# Patient Record
Sex: MALE | Race: WHITE | NOT HISPANIC OR LATINO | URBAN - METROPOLITAN AREA
[De-identification: names, ages, dates, MRNs, and addresses within clinical notes are randomized per-mention and may not be internally consistent; named-entity substitution may affect disease eponyms.]

---

## 2021-03-31 DIAGNOSIS — Z23 ENCOUNTER FOR IMMUNIZATION: ICD-10-CM

## 2022-09-07 ENCOUNTER — TELEMEDICINE (OUTPATIENT)
Dept: BEHAVIORAL/MENTAL HEALTH CLINIC | Facility: CLINIC | Age: 67
End: 2022-09-07
Payer: COMMERCIAL

## 2022-09-07 DIAGNOSIS — F33.1 MAJOR DEPRESSIVE DISORDER, RECURRENT EPISODE, MODERATE (HCC): Primary | ICD-10-CM

## 2022-09-07 PROCEDURE — 90834 PSYTX W PT 45 MINUTES: CPT | Performed by: PSYCHOLOGIST

## 2022-09-07 NOTE — PSYCH
Psychotherapy Provided: Individual Psychotherapy 45 minutes     Length of time in session: 45 minutes, follow up in 1 week  Week  Began at 10: 03 and ended at 11:08 with a couple interruptions due to amwell problems  No diagnosis found  Major depressive disorder moderate recurrent    Goals addressed in session: Goal 1     Pain:      moderate to severe    5    Current suicide risk : Low     D:  Reached client on Amwell Now  Client had some issues connecting initially, unable to get cursor to move to "name" section  Reached him on phone and talked him through process  He tried multiple things and was finally able to connect on Amwell Now  Session lasted 30 minutes then timed out  IT was able to recover video - EAD  times out after 30 minutes  Can now address issue  Client stated that he has been feeling angry, depressed, chaotic last couple months  He was triggered again in July by the "five week" event with his partner - an incident that happened as the relationship was turning more serious and exclusive  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, but 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  Reviewed coping skills, what has worked in the past which is no longer working, and what he is trying now  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, 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  A:  Client's mood was depressed, affect congruent  Reported that he is easily triggered to anger  He has interrupted sleep which exacerbates depressed mood  Also complained of anxiety and pounding heart  Speech was normal in rate, quantity and volume  Client was alert, and oriented x3  Client was friendly, cooperative and mostly relaxed  Thought process was logical, and he complained of emotions at times interfering with his problem solving skills  Judgment and insight were fair    Attention was consistent  P:  Return in one week  Client will continue to use learned coping skills  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

## 2022-09-14 ENCOUNTER — TELEMEDICINE (OUTPATIENT)
Dept: BEHAVIORAL/MENTAL HEALTH CLINIC | Facility: CLINIC | Age: 67
End: 2022-09-14
Payer: COMMERCIAL

## 2022-09-14 DIAGNOSIS — F33.1 MAJOR DEPRESSIVE DISORDER, RECURRENT EPISODE, MODERATE (HCC): Primary | ICD-10-CM

## 2022-09-14 PROCEDURE — 90834 PSYTX W PT 45 MINUTES: CPT | Performed by: PSYCHOLOGIST

## 2022-09-14 NOTE — PSYCH
Psychotherapy Provided: Individual Psychotherapy 45 minutes     Length of time in session: 45 minutes, follow up in 1 week    Encounter Diagnosis     ICD-10-CM    1  Major depressive disorder, recurrent episode, moderate (HCC)  F33 1        Goals addressed in session: Goal 1     Pain:      moderate to severe    5    Current suicide risk : Low     D:  Reached client at home via [address] now  Client had some computer difficulties so we spoke by phone for the first 20 minutes (about), as his computer loaded  Finished last 40 minutes (about) via [employer] now  Client reported that he has been feeling more angry - when driving, with his wife when triggered, with family, etc   However, overall, things with his partner are going very well  He is also helping out his son with  and mowing  Reviewed the Forgiveness handout and he will practice this week  Also reviewed thought stopping and thought swapping - to more positive thoughts  He will try that - try to create new neural pathways that are more positive  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, flying, snowmobile, , - explored how he is used to the surge of adrenalin - which may also come with anger and has become a familiar habit or mental pathway  Talked about benefits of creating more positive focused neural pathways  A:  Client was friendly, cooperative and casually dressed  He was alert and oriented x3  Speech was normal in rate, volume and quantity  Mood was somewhat discouraged, affect appropriate to situation  Thought process was logical and tinged by emotions at times  No issues with SI/HI/ DnA  P:  Return in one week    Client shared that he has been feeling extremely anxious and angry lately, and is realizing that his anger reactions are more intense than normal  He is interested in the Anger Management Group as well and I will make referral  Will practice thought stopping - and then replacing negative thoughts with more positive ones  Will re-evaluate next session  Behavioral Health Treatment Plan [facility]: Diagnosis and Treatment Plan explained to Naun Harris relates understanding diagnosis and is agreeable to Treatment Plan  Yes     Virtual Regular Visit    Verification of patient location:    Patient is located in the following state in which I hold an active license [place]      Assessment/Plan:    Problem List Items Addressed This Visit        Other    Major depressive disorder, recurrent episode, moderate ([address] ) - Primary          Goals addressed in session: Goal 1          Reason for visit is No chief complaint on file  Encounter provider Clayton Hartman PhD    Provider located at [address] [place] [address]  [phone]      Recent Visits  Date Type Provider Dept   09/07/22 Telemedicine PhD Lauro Jimenez recent visits within past 7 days and meeting all other requirements  Today's Visits  Date Type Provider Dept   09/14/22 Telemedicine PhD Lauro Jimenez today's visits and meeting all other requirements  Future Appointments  No visits were found meeting these conditions  Showing future appointments within next 150 days and meeting all other requirements       The patient was identified by name and date of birth  Hiteshmalik Moreira was informed that this is a telemedicine visit and that the visit is being conducted through[facility] and patient was informed that this is a secure, HIPAA-compliant platform  He agrees to proceed     My office door was closed  No one else was in the room  He acknowledged consent and understanding of privacy and security of the video platform   The patient has agreed to participate and understands they can discontinue the visit at any time  Patient is aware this is a billable service  Juju Elizabeth is a 69 y o  male who was friendly and cooperative throughout the session   HPI     No past medical history on file  No past surgical history on file  No current outpatient medications on file  No current facility-administered medications for this visit  Not on File    Review of Systems    Video Exam    There were no vitals filed for this visit      Physical Exam     I spent 45 minutes directly with the patient during this visit

## 2022-09-22 ENCOUNTER — TELEMEDICINE (OUTPATIENT)
Dept: BEHAVIORAL/MENTAL HEALTH CLINIC | Facility: CLINIC | Age: 67
End: 2022-09-22
Payer: COMMERCIAL

## 2022-09-22 DIAGNOSIS — F33.1 MAJOR DEPRESSIVE DISORDER, RECURRENT EPISODE, MODERATE (HCC): Primary | ICD-10-CM

## 2022-09-22 PROCEDURE — 90834 PSYTX W PT 45 MINUTES: CPT | Performed by: PSYCHOLOGIST

## 2022-09-22 NOTE — PSYCH
Virtual Regular Visit    Verification of patient location:    Patient is located in the following state in which I hold an active license [place]   Session began at 10:03  Session ended at 10:55a      Assessment/Plan:    Problem List Items Addressed This Visit        Other    Major depressive disorder, recurrent episode, moderate ([address] ) - Primary          Goals addressed in session: Goal 1          Reason for visit is No chief complaint on file  Encounter provider Mary Ferrara, PhD    Provider located at [address] [place] [id number] [phone]      Recent Visits  Date Type Provider Dept   09/22/22 Telemedicine Mary Ferrara, PhD Romayne Sine recent visits within past 7 days and meeting all other requirements  Future Appointments  No visits were found meeting these conditions  Showing future appointments within next 150 days and meeting all other requirements       The patient was identified by name and date of birth  Isabell Whiting was informed that this is a telemedicine visit and that [facility] and patient was informed that this is a secure, HIPAA-compliant platform  He agrees to proceed     My office door was closed  No one else was in the room  He acknowledged consent and understanding of privacy and security of the video platform  The patient has agreed to participate and understands they can discontinue the visit at any time  Patient is aware this is a billable service  Subjective  Isabell Whiting is a 69 y o  male who was friendly and cooperative  D:  Client was reached on [facility] now  Client had some trouble connecting, and we worked through this on the phone  He was able to connect fairly quickly   Client shared that he had been at the races over the weekend, and had 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 "  With some negotiating, he was able to set things up  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 about driving it more, and was given a positive response and a cost   Talked about his relationship with Amanda, and how she is feeling less secure  Some frustration with selling the business, and also, past conflict that occurred between the two of them when he was racing in the past   Client able to have better understanding of current stressors and what they may be triggering in himself and in [place]  A:  Client was casually dressed, friendly and cooperative  He was alert and orientec x3  Speech was clear and  Mood was good  Affect was congruent  No issues with si, hi, dna  Insight and judgment were intact  Client processed his emotions, was able to see the positives in his current relationship, but also realize that things might be affect ing [place] in a way that was different from how they were affecting him  Expressed appreciation for the session  P:  Return in 1-2 weeks  Will move to biweekly  Would appreciate support and help with managing relationship with Alejandra as they sell business, and perhaps, move to [place] in the future  Continue to use coping skills  Continue to listen to wife and be supportive  HPI     No past medical history on file  No past surgical history on file  No current outpatient medications on file  No current facility-administered medications for this visit  Not on File    Review of Systems    Video Exam    There were no vitals filed for this visit      Physical Exam     I spent 50 minutes directly with the patient during this visit

## 2022-09-29 ENCOUNTER — TELEMEDICINE (OUTPATIENT)
Dept: BEHAVIORAL/MENTAL HEALTH CLINIC | Facility: CLINIC | Age: 67
End: 2022-09-29
Payer: COMMERCIAL

## 2022-09-29 DIAGNOSIS — F33.1 MAJOR DEPRESSIVE DISORDER, RECURRENT EPISODE, MODERATE (HCC): Primary | ICD-10-CM

## 2022-09-29 PROCEDURE — 90834 PSYTX W PT 45 MINUTES: CPT | Performed by: PSYCHOLOGIST

## 2022-09-29 NOTE — PSYCH
Virtual Regular Visit    Verification of patient location:    Patient is located in the following state in which I [place] an [place] license [place]      Assessment/Plan:    Problem List Items Addressed This Visit    None         Goals addressed in session: Goal 1          Reason for visit is No chief complaint on file  Encounter provider Liam Friedman, PhD    Provider located at [address] [place] [address] [address]  [address] [place] [id number] [phone]      Recent Visits  Date Type Provider Dept   09/22/22 Telemedicine Liam Friedman, PhD Mac Torres recent visits within past 7 days and meeting all other requirements  Future Appointments  No visits were found meeting these conditions  Showing future appointments within next 150 days and meeting all other requirements       The patient was identified by name and date of birth  Sarah Mix was informed that this is a telemedicine visit and that [facility] and patient was informed that this is a secure, HIPAA-compliant platform  He agrees to proceed     My office door was closed  No one else was in the room  He acknowledged consent and understanding of privacy and security of the video platform  The patient has agreed to participate and understands they can discontinue the visit at any time  Patient is aware this is a billable service  Subjective  Sarah Mix is a 69 y o  male who was friendly and cooperative    D:  Reached client at home on amwell now  Connected by phone, [employer] link, then client was able to connect on [address] now  Client reported that he told Brenna Martínez that he did not trust her - that he was afraid to leave home and leave her behind  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 and feels that it is his anger at Brenna Martínez that is bothering him   We did a guided medication where he visualized the anger (as a tight knot in his stomach)  He then painted the knot "red" and imagined it spinning (clockwise)  Through the guided meditation he slowed down the spin and  Began to wash out the red color  It them jumped to the wheels of a race car spinning - and they were black  Again used breath work to slow down the spin and lighten the color  Then reported that he could feel his mother's hand on his shoulder and her eyes on him  Reported that he felt peaceful and content - no racing thoughts or angry feelings  Tapped in this peacefulness  A: Client was friendly and cooperative  He was alert and oriented x3  Speech was normal in quantity, rate and volume,  Mood was good and affect congruent  Client reported that sometimes he becomes very angry and has a hard time managing it  Thought process was logical with no issues with SI or HI  Insight and judgment were fair  Client was able to use his breath and relaxation to ease the knot of anger inside of him - at least temporarily  P:  Return in two weeks  Continue to use mindfulness to manage emotions  Try to accept the good that comes his way and focus on that, rather than the bad  [facility]     No past medical history on file  No past surgical history on file  No current outpatient medications on file  No current facility-administered medications for this visit  Not on File    Review of Systems    Video Exam    There were no vitals filed for this visit      Physical Exam     I spent 45 minutes directly with the patient during this visit

## 2022-10-12 ENCOUNTER — TELEMEDICINE (OUTPATIENT)
Dept: BEHAVIORAL/MENTAL HEALTH CLINIC | Facility: CLINIC | Age: 67
End: 2022-10-12
Payer: COMMERCIAL

## 2022-10-12 DIAGNOSIS — F33.1 MAJOR DEPRESSIVE DISORDER, RECURRENT EPISODE, MODERATE (HCC): Primary | ICD-10-CM

## 2022-10-12 PROCEDURE — 90834 PSYTX W PT 45 MINUTES: CPT | Performed by: PSYCHOLOGIST

## 2022-10-12 NOTE — PSYCH
Virtual Regular Visit    Verification of patient location:    Patient is located in the following state in which I hold an [place] license [place]     Start time 9:05 a  Justice Rust time 9: 55a      Assessment/Plan:    Problem List Items Addressed This Visit    None         Goals addressed in session: Goal 1          Reason for visit is No chief complaint on file  Encounter provider Marie Roland, PhD    Provider located at [address] [place] [address] [address]  [address] [place] [address]  [phone]      Recent Visits  No visits were found meeting these conditions  Showing recent visits within past 7 days and meeting all other requirements  Future Appointments  No visits were found meeting these conditions  Showing future appointments within next 150 days and meeting all other requirements       The patient was identified by name and date of birth  Cali Ordaz was informed that this is a telemedicine visit and that the visit is being conducted through[employer] and patient was informed that this is a secure, HIPAA-compliant platform  He agrees to proceed     My office door was closed  No one else was in the room  He acknowledged consent and understanding of privacy and security of the video platform  The patient has agreed to participate and understands they can discontinue the visit at any time  Patient is aware this is a billable service  Subjective  Cali Ordaz is a 69 y o  male Client was friendly and cooperative  Start time 9:05 a  Justice Rust time 5: 55a    D:  Reached client on amwell now  Client reported that he is feeling more and more easily angered  He stated that he can get very angry driving in traffic and needs to use his coping skills to help calm himself down  He reported that he and his wife are planning to move to [place] in one year - after Heather James sells her business    His son was surprised to hear this, but Al pointed out that his son barely does anything together with him as a family, so he will not be losing much  A: Client was alert and oriented x3  Friendly and cooperative  Spoke easily about increased feelings of anger  He is interested in the Anger Management group and is on the wait list for the next group to start  Talked about ways to manage anger and explored some of the deeper traumas which may be feeding the anger  Provided empathy and support along with helping client explore his hurt and anger issues throughout the years  P:  Return in 2-3 weeks  Client will continue to use mindfulness to help manage emotions  He will continue to practice strong box and safe place  He will focus on the positive things in his life, and shift his attention to toward the positive, after thought stopping the negative chain of thoughts  Erwin Flowers HPI     No past medical history on file  No past surgical history on file  No current outpatient medications on file  No current facility-administered medications for this visit  Not on File    Review of Systems    Video Exam    There were no vitals filed for this visit      Physical Exam     I spent 50 minutes with patient today in which greater than 50% of the time was spent in counseling/coordination of care regarding exploring anger issues

## 2022-10-27 ENCOUNTER — TELEMEDICINE (OUTPATIENT)
Dept: BEHAVIORAL/MENTAL HEALTH CLINIC | Facility: CLINIC | Age: 67
End: 2022-10-27
Payer: COMMERCIAL

## 2022-10-27 DIAGNOSIS — F33.1 MAJOR DEPRESSIVE DISORDER, RECURRENT EPISODE, MODERATE (HCC): Primary | ICD-10-CM

## 2022-10-27 PROCEDURE — 90834 PSYTX W PT 45 MINUTES: CPT | Performed by: PSYCHOLOGIST

## 2022-10-27 NOTE — PSYCH
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, but has not heard back  Talked about his fear of failure, and his long time sense of being rejected by others  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, and won two races - but still has the long lasting belief that he is a failure  Stated that he had ADHD as a child, which caused him to act impulsively, and to be punished for these behaviors  A:  Client reported that he continues to have a short fuse and be easily irritated  He is still interested in the anger management group  Client wsa alert and oriented x3; he was friendly and cooperative  Speech was normal in quantity, rate and volume  Mood was reported as  irritable, affect variable -     P:  He will take a look at the [employer] and [employer] protocol online  Will discuss further whether something like this might be helpful  Will find ways to support his grandson  Will continue with positive self talk and other learned coping skills  Return in two weeks  Assessment/Plan:    Problem List Items Addressed This Visit        Other    Major depressive disorder, recurrent episode, moderate ([address] ) - Primary          Goals addressed in session: Goal 1          Reason for visit is   Chief Complaint   Patient presents with   • Depression   • Relationship Issues     Address issues with wife and children          Encounter provider [facility], PhD    Provider located at [address] [place] [address] [address]  [address]  [phone]      Recent Visits  No visits were found meeting these conditions  Showing recent visits within past 7 days and meeting all other requirements  Today's Visits  Date Type Provider Dept   10/27/22 Telemedicine Jaun Tucker, PhD Nicole Acevedo today's visits and meeting all other requirements  Future Appointments  No visits were found meeting these conditions  Showing future appointments within next 150 days and meeting all other requirements       The patient was identified by name and date of birth  Juan Jose Hernandez was informed that this is a telemedicine visit and that the visit is being conducted throughTelephone  My office door was closed  No one else was in the room  He acknowledged consent and understanding of privacy and security of the video platform  The patient has agreed to participate and understands they can discontinue the visit at any time  Patient is aware this is a billable service  Subjective  Juan Jose Hernandez is a 69 y o  male who was friendly and cooperative  [facility]     No past medical history on file  No past surgical history on file  No current outpatient medications on file  No current facility-administered medications for this visit  Not on File    Review of Systems    Video Exam    There were no vitals filed for this visit      Physical Exam     Visit Time    Visit Start Time: 9:03a  Visit Stop Time:  895M  Total Visit Duration: 52 minutes

## 2022-11-10 ENCOUNTER — TELEMEDICINE (OUTPATIENT)
Dept: BEHAVIORAL/MENTAL HEALTH CLINIC | Facility: CLINIC | Age: 67
End: 2022-11-10

## 2022-11-10 DIAGNOSIS — F33.1 MAJOR DEPRESSIVE DISORDER, RECURRENT EPISODE, MODERATE (HCC): Primary | ICD-10-CM

## 2022-11-14 NOTE — PSYCH
Virtual Regular Visit    Verification of patient location:    Patient is located in the following state in which I hold an [place] license [place]      Assessment/Plan:    Problem List Items Addressed This Visit        Other    Major depressive disorder, recurrent episode, moderate ([facility] [address] [facility] ) - Primary          Goals addressed in session: Goal 1          Reason for visit is No chief complaint on file  Encounter provider Valdemar Schlatter, PhD    Provider located at [address] [place] [id number] [phone]      Recent Visits  Date Type Provider Dept   11/10/22 Telemedicine Valdemar Schlatter, PhD Marylou Peres recent visits within past 7 days and meeting all other requirements  Future Appointments  No visits were found meeting these conditions  Showing future appointments within next 150 days and meeting all other requirements       The patient was identified by name and date of birth  Sylvia Perkins was informed that this is a telemedicine visit and that the visit is being conducted throughthe [employer] platform  He agrees to proceed     My office door was closed  No one else was in the room  He acknowledged consent and understanding of privacy and security of the video platform  The patient has agreed to participate and understands they can discontinue the visit at any time  Patient is aware this is a billable service  Subjective  Sylvia Perkins is a 69 y o  male     D:  Reached client on [address] now  Client reported that he is very concerned about his grandson  Apparently his grandson has ADHD, and can be quite impulsive  This has caused him some trouble at  and school  Al sees himself in his grandson, and is trying to provide support and positive affirmations  Discussed how to be supportive to his son, his grandson, and how to build good self esteem and academic skills  Al worked through multiple emotions, some related to his own childhood experiences  He came up with some ideas of how to be helpful and supportive and felt good at the end of the session  Provided empathy and support as client explored his own reactions to his grandson and considered the best way to be supportive  A:  Client was casually dressed and reached at home  He was friendly and cooperative; alert and oriented x3  Speech was normal in rate, volume and quantity  Mood was sad/angry; affect appropriate to situation  Thought process was logical and goal directed  No issues with SI or HI  Insight and judgment were good  Client expressed emapthy for his grandson, and explored triggers from his past      P:  Return in 2 weeks  Continue to use learned coping skills  Reach out to family and friends for support  Research Safe and Sound protocol  Will discuss more next session  Continue to be supportive to both son and grandson  Reach out to [facility] to arrange anger management classes  Zafar Chan HPI     No past medical history on file  No past surgical history on file  No current outpatient medications on file  No current facility-administered medications for this visit  Not on File    Review of Systems    Video Exam    There were no vitals filed for this visit      Physical Exam     Visit Time    11/14/22  Start Time: 2469  Stop Time: 9918  Total Visit Time: 60 minutes

## 2022-11-30 ENCOUNTER — TELEMEDICINE (OUTPATIENT)
Dept: BEHAVIORAL/MENTAL HEALTH CLINIC | Facility: CLINIC | Age: 67
End: 2022-11-30

## 2022-11-30 DIAGNOSIS — F33.1 MAJOR DEPRESSIVE DISORDER, RECURRENT EPISODE, MODERATE (HCC): Primary | ICD-10-CM

## 2022-11-30 NOTE — PSYCH
Virtual Regular Visit    Verification of patient location:    Patient is located in the following state in which I hold an active license [place]      Assessment/Plan:    Problem List Items Addressed This Visit        Other    Major depressive disorder, recurrent episode, moderate ([facility] [address] ) - Primary       Goals addressed in session: Goal 1          Reason for visit is No chief complaint on file  Encounter provider Ana Espinoza, PhD    Provider located at [address] [place] [address] [address] [place] [address]  [phone]      Recent Visits  No visits were found meeting these conditions  Showing recent visits within past 7 days and meeting all other requirements  Today's Visits  Date Type Provider Dept   11/30/22 Telemedicine Ana Espinoza, PhD Rowena Hogue today's visits and meeting all other requirements  Future Appointments  No visits were found meeting these conditions  Showing future appointments within next 150 days and meeting all other requirements       The patient was identified by name and date of birth  Connor Marshall was informed that this is a telemedicine visit and that the visit is being conducted throughthe [employer] platform  He agrees to proceed     My office door was closed  No one else was in the room  He acknowledged consent and understanding of privacy and security of the video platform  The patient has agreed to participate and understands they can discontinue the visit at any time  Patient is aware this is a billable service  Subjective  Connor Marshall is a 69 y o  male     D:  Reached client on [employer]  He shared that he is worried about his grandson and son's relationship- his grandson Ross Kiser has ADHD   "I was feeling vulnerable and scared about Ross Kiser, and hurting for him "  You give yourself away (song on radio)  Work is slow   Watching a show [place]   Provided some input on how to be supportive to hernando  Explored issues with trust which is a theme throughout his life, and how this continues to affect his relationship with his wife  A:  Talked about trust as a thread from childhood and into his marriages and even current relationship  Client was friendly and cooperative  He was alert and oriented x3  Mood was mostly good, affect sad when he spoke about his grandson  Client shared that he has had lots of emotions whirling inside of him lately, anger, distress, sadness, frustration  Talked about his ADHD and how his brain would "short circuit" as a child and he would find himself in trouble or ignored and unable to handle that  Talked about being ignored as a child, having anxiety about school, throwing up weekly or more often before school      P:  Return in two weeks  Then client will be on vacation for a couple of weeks, rescheduled in mid January  Talk with grandpaul about "10 hugs a day" and make a game of it  Try Shake It Out exercise with grandson  Help him give positive messages to his dad  HPI     No past medical history on file  No past surgical history on file  No current outpatient medications on file  No current facility-administered medications for this visit  Not on File    Review of Systems    Video Exam    There were no vitals filed for this visit      Physical Exam     Visit Time    11/30/22  Start Time: 0754  Stop Time: 3363  Total Visit Time: 62 minutes

## 2022-12-15 ENCOUNTER — TELEMEDICINE (OUTPATIENT)
Dept: BEHAVIORAL/MENTAL HEALTH CLINIC | Facility: CLINIC | Age: 67
End: 2022-12-15

## 2022-12-15 DIAGNOSIS — F33.1 MAJOR DEPRESSIVE DISORDER, RECURRENT EPISODE, MODERATE (HCC): Primary | ICD-10-CM

## 2022-12-15 NOTE — PSYCH
Virtual Regular Visit    Verification of patient location:    Patient is located in the following state in which I hold an active license [place]      Assessment/Plan:    Problem List Items Addressed This Visit        Other    Major depressive disorder, recurrent episode, moderate ([facility] [address] ) - Primary       Goals addressed in session: Goal 1          Reason for visit is No chief complaint on file  Encounter provider Rosa Bhatti, PhD    Provider located at [address] [place] [address] [address]  [address] [place] [id number] [phone]      Recent Visits  Date Type Provider Dept   12/15/22 Telemedicine Rosa Bhatti, PhD Kwaku Rich recent visits within past 7 days and meeting all other requirements  Future Appointments  No visits were found meeting these conditions  Showing future appointments within next 150 days and meeting all other requirements       The patient was identified by name and date of birth  Frandy Riley was informed that this is a telemedicine visit and that the visit is being conducted throughthe [employer] platform  He agrees to proceed     My office door was closed  No one else was in the room  He acknowledged consent and understanding of privacy and security of the video platform  The patient has agreed to participate and understands they can discontinue the visit at any time  Patient is aware this is a billable service  Subjective  Frandy Riley is a 69 y o  male     D:  Reached client on [employer]  Client stated that he is finding himself triggered by songs which talk about unfaithful partners  Used some EMDR and some IFS to process these feelings  He was able to identify a part of  Himself - a part that was holding on the the intense feelings  One part stated, I want you to feel what it is like to be betrayed    Another part, is holding on to the feelings of pain, so he will never forget, and it won't happen again  A:  Client appeared sad and frustrated  He reported that he is having trouble managing his emotions, which seem to be easily triggered  Speech was normal in quantity reate and volume  Thought process was logical and goal directed  Client was experiencing lots of emotions  No issues with Si or HI      P:  Return after his vacation at [facility]  Use learned coping skills  Continue honoring his protector parts  Continue processing at next session  Sonna Shelter HPI     No past medical history on file  No past surgical history on file  No current outpatient medications on file  No current facility-administered medications for this visit  Not on File    Review of Systems    Video Exam    There were no vitals filed for this visit      Physical Exam     Visit Time    12/20/22  Start Time: 1004  Stop Time: 8179  Total Visit Time: 55 minutes

## 2023-01-18 ENCOUNTER — TELEMEDICINE (OUTPATIENT)
Dept: BEHAVIORAL/MENTAL HEALTH CLINIC | Facility: CLINIC | Age: 68
End: 2023-01-18

## 2023-01-18 DIAGNOSIS — F33.1 MAJOR DEPRESSIVE DISORDER, RECURRENT EPISODE, MODERATE (HCC): Primary | ICD-10-CM

## 2023-01-18 NOTE — BH TREATMENT PLAN
Génesis Jordan  1955       Date of Initial Treatment Plan: 1/18/2023  Date of Current Treatment Plan: 01/18/23    Treatment Plan Number 1     Strengths/Personal Resources for Self Care: Hard worker, good father, thoughtful, kind, generous, loves adventure, financially savvy, enjoys race cars  Diagnosis:   1  Major depressive disorder, recurrent episode, moderate ([facility] [address] )            Area of Needs: Working on improving family relationships and addressing trauma from childhood which continues to negatively impact current life events  Long Term Goal 1: A  Continue to process trauma from past which negatively impacts present relationships    Target Date: N/A  Completion Date: Review in 6 months         Short Term Objectives for Goal 1: A  Trauma work to address childhood issues, explore ADD symptoms & current impact    GOAL 1: Modality: Individual 2x per month   Completion Date review in 6 months      [address]: Diagnosis and Treatment Plan explained to Anna Mayen relates understanding diagnosis and is agreeable to Treatment Plan  Client Comments : Please share your thoughts, feelings, need and/or experiences regarding your treatment plan:  Treatment plan looks good
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
## 2023-02-01 ENCOUNTER — TELEMEDICINE (OUTPATIENT)
Dept: BEHAVIORAL/MENTAL HEALTH CLINIC | Facility: CLINIC | Age: 68
End: 2023-02-01

## 2023-02-01 DIAGNOSIS — F90.0 ATTENTION DEFICIT HYPERACTIVITY DISORDER, INATTENTIVE TYPE: ICD-10-CM

## 2023-02-01 DIAGNOSIS — F33.1 MAJOR DEPRESSIVE DISORDER, RECURRENT EPISODE, MODERATE (HCC): Primary | ICD-10-CM
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
## 2023-02-16 ENCOUNTER — TELEMEDICINE (OUTPATIENT)
Dept: BEHAVIORAL/MENTAL HEALTH CLINIC | Facility: CLINIC | Age: 68
End: 2023-02-16

## 2023-02-16 DIAGNOSIS — F90.0 ATTENTION DEFICIT HYPERACTIVITY DISORDER, INATTENTIVE TYPE: Primary | ICD-10-CM

## 2023-02-16 NOTE — PSYCH
Behavioral Health Psychotherapy Progress Note    Psychotherapy Provided: Individual Psychotherapy     1  Attention deficit hyperactivity disorder, inattentive type            Goals addressed in session: Goal 1     DATA: Reached clindet on amwell now  Client stated that he had had a good talk with Radha Garcia - that she had honestly admitted some of what was going through her mind during the "five" weeks and had honestly shared with him and acknowledged how that had hurt him  Client reported that it had been a good and sincere discussion  However, he then stated that he still felt angry  He felt that we had made some progress, but he was still angry and easily triggered and felt that little progress had been made in this regard  Sequenced through his moods and he reported that despite Amanda giving a more heartfelt apology, he was still angry  We talked about the roots of this anger, and he was able to see that it reached to childhood  During this session, this clinician used the following therapeutic modalities: trauma therapy    Substance Abuse was addressed during this session  If the client is diagnosed with a co-occurring substance use disorder, please indicate any changes in the frequency or amount of use: drinking less  Stage of change for addressing substance use diagnoses: Maintenance    ASSESSMENT:  Eliezer Burroughs presents with a Euthymic/ normal and Angry mood  He stated that he was easily upset when driving and that he does not trust Amanda and is refusing to go on fun activities because he does not want to leave her home alone  his affect is Normal range and intensity and Bright, which is congruent, with his mood and the content of the session  The client has made progress on their goals  Eliezer Burroughs presents with a minimal risk of suicide, minimal risk of self-harm, and minimal risk of harm to others      For any risk assessment that surpasses a "low" rating, a safety plan must be developed  A safety plan was indicated: no  If yes, describe in detail reach out to family and friends for support    PLAN: Between sessions,  See will continue to use mindfulness to  Manage moods, continue to focus on the positive, and make note of any times when he feels sad or angry  At the next session, the therapist will use EMDR (or other form of bilateral Stimulation) to address trauma from childhood  Behavioral Health Treatment Plan and Discharge Planning: Wing Valerio is aware of and agrees to continue to work on their treatment plan  They have identified and are working toward their discharge goals  yes    Visit start and stop times:    02/21/23  Start Time: 1003  Stop Time: 1055  Total Visit Time: 52 minutes         Virtual Regular Visit    Verification of patient location:    Patient is located in the following state in which I hold an active license [place]      Assessment/Plan:    Problem List Items Addressed This Visit    None  Visit Diagnoses     Attention deficit hyperactivity disorder, inattentive type    -  Primary          Goals addressed in session: Goal 1          Reason for visit is   Chief Complaint   Patient presents with   • Virtual Regular Visit        Encounter provider Marah Ventura, PhD    Provider located at [address] [place] [address]  [phone]      Recent Visits  Date Type Provider Dept   02/16/23 Telemedicine Marah Ventura, PhD Franky Hood recent visits within past 7 days and meeting all other requirements  Future Appointments  No visits were found meeting these conditions  Showing future appointments within next 150 days and meeting all other requirements       The patient was identified by name and date of birth   Wing Valerio was informed that this is a telemedicine visit and that the [facility]  He agrees to proceed     My office door was closed  No one else was in the room  He acknowledged consent and understanding of privacy and security of the video platform  The patient has agreed to participate and understands they can discontinue the visit at any time  Patient is aware this is a billable service  Subjective  Mansi Andujar is a 69 y o  male who was friendly and cooperative   [facility]     No past medical history on file  No past surgical history on file  No current outpatient medications on file  No current facility-administered medications for this visit  Not on File    Review of Systems    Video Exam    There were no vitals filed for this visit      Physical Exam     Visit Time  02/21/23  Start Time: 1003  Stop Time: 0932  Total Visit Time: 52 minutes

## 2023-03-01 ENCOUNTER — TELEMEDICINE (OUTPATIENT)
Dept: BEHAVIORAL/MENTAL HEALTH CLINIC | Facility: CLINIC | Age: 68
End: 2023-03-01

## 2023-03-01 DIAGNOSIS — F33.1 MAJOR DEPRESSIVE DISORDER, RECURRENT EPISODE, MODERATE (HCC): Primary | ICD-10-CM

## 2023-03-01 DIAGNOSIS — F90.0 ATTENTION DEFICIT HYPERACTIVITY DISORDER, INATTENTIVE TYPE: ICD-10-CM
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
## 2023-03-15 ENCOUNTER — TELEMEDICINE (OUTPATIENT)
Dept: BEHAVIORAL/MENTAL HEALTH CLINIC | Facility: CLINIC | Age: 68
End: 2023-03-15

## 2023-03-15 DIAGNOSIS — F90.0 ATTENTION DEFICIT HYPERACTIVITY DISORDER, INATTENTIVE TYPE: ICD-10-CM

## 2023-03-15 DIAGNOSIS — F33.1 MAJOR DEPRESSIVE DISORDER, RECURRENT EPISODE, MODERATE (HCC): Primary | ICD-10-CM

## 2023-03-15 NOTE — PSYCH
Behavioral Health Psychotherapy Progress Note      Psychotherapy Provided: Individual Psychotherapy     1  Major depressive disorder, recurrent episode, moderate ([address] )        2  Attention deficit hyperactivity disorder, inattentive type            Goals addressed in session: Goal 1     DATA:     Reached client on amwell now  Client shared that he still struggles with staying in the present moment  He often remembers negative things from the past, Which fill him with self loathing and anger  He then lashes out or has to struggle to keep himself from lashing out  Explored where his anger was coming from  Client was able to remember many incidents in the past when he was either angry at himself or at others  He shared how he was able to keep his secret world separate and felt energized and rewarded for his ability to keep all the balls in the air at once  However, now he does have some regret and a part of him wants him to remember the dangers if he were to ever let his guard down  Client shared that he knows he will never cheat again, but another part of him may be sabotaging his ability to trust Amanda as a way of punishing himself for some of his mistakes  He agreed that his reasoning and emotions are complicated and multifaceted  Will continue to explore this problem in the next sessin  During this session, this clinician used the following therapeutic modalities: Cognitive Behavioral Therapy    Substance Abuse was addressed during this session  If the client is diagnosed with a co-occurring substance use disorder, please indicate any changes in the frequency or amount of use: no change  Stage of change for addressing substance use diagnoses: Maintenance    ASSESSMENT:  Faiza Willis presents with a Euthymic/ normal and Angry mood  his affect is Normal range and intensity, which is congruent, with his mood and the content of the session  The client has made progress on their goals        Faiza Willis presents with a minimal risk of suicide, minimal risk of self-harm, and minimal risk of harm to others  For any risk assessment that surpasses a "low" rating, a safety plan must be developed  A safety plan was indicated: no  If yes, describe in detail reach out to family and friends for support as needed    PLAN: Between sessions, Feli Ashby will continue to focus on the positive, will begin to keep a small journal of insights learned in therapy, will consider forgiving himself for some of the mistakes he has made in the past    At the next session, the therapist will use Cognitive Behavioral Therapy to address issues of anger and depression  Behavioral Health Treatment Plan and Discharge Planning: Feli Ashby is aware of and agrees to continue to work on their treatment plan  They have identified and are working toward their discharge goals   yes    Visit start and stop times:    03/20/23  Start Time: 0800  Stop Time: 0855  Total Visit Time: 55 minutes    Virtual Regular Visit    Verification of patient location:    Patient is located in the following state in which I hold an active license [place]      Assessment/Plan:    Problem List Items Addressed This Visit        Other    Major depressive disorder, recurrent episode, moderate ([facility] [address] ) - Primary   Other Visit Diagnoses     Attention deficit hyperactivity disorder, inattentive type              Goals addressed in session: Goal 1          Reason for visit is   Chief Complaint   Patient presents with   • Virtual Regular Visit        Encounter provider Mo Rivera, PhD    Provider located at [address] [place] [id number] [phone]      Recent Visits  Date Type Provider Dept   03/15/23 Telemedicine Mo Rivera, PhD Patrizia Santos recent visits within past 7 days and meeting all other requirements  Future Appointments  No visits were found meeting these conditions  Showing future appointments within next 150 days and meeting all other requirements       The patient was identified by name and date of birth  Raymundo Fitzgerald was informed that this is a telemedicine visit and that the visit is being conducted throughthe [employer] platform  He agrees to proceed     My office door was closed  No one else was in the room  He acknowledged consent and understanding of privacy and security of the video platform  The patient has agreed to participate and understands they can discontinue the visit at any time  Patient is aware this is a billable service  Subjective  Raymundo Fitzgerald is a 69 y o  male who was friendly and cooperative   [facility]     No past medical history on file  No past surgical history on file  No current outpatient medications on file  No current facility-administered medications for this visit  Not on File    Review of Systems    Video Exam    There were no vitals filed for this visit      Physical Exam     Visit Time  03/20/23  Start Time: 0800  Stop Time: 1629  Total Visit Time: 55 minutes

## 2023-03-29 ENCOUNTER — TELEMEDICINE (OUTPATIENT)
Dept: BEHAVIORAL/MENTAL HEALTH CLINIC | Facility: CLINIC | Age: 68
End: 2023-03-29

## 2023-03-29 DIAGNOSIS — F33.1 MAJOR DEPRESSIVE DISORDER, RECURRENT EPISODE, MODERATE (HCC): Primary | ICD-10-CM

## 2023-03-29 DIAGNOSIS — F90.0 ATTENTION DEFICIT HYPERACTIVITY DISORDER, INATTENTIVE TYPE: ICD-10-CM
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
## 2023-05-10 ENCOUNTER — TELEMEDICINE (OUTPATIENT)
Dept: BEHAVIORAL/MENTAL HEALTH CLINIC | Facility: CLINIC | Age: 68
End: 2023-05-10

## 2023-05-10 DIAGNOSIS — F33.1 MAJOR DEPRESSIVE DISORDER, RECURRENT EPISODE, MODERATE (HCC): Primary | ICD-10-CM

## 2023-05-10 DIAGNOSIS — F90.0 ATTENTION DEFICIT HYPERACTIVITY DISORDER, INATTENTIVE TYPE: ICD-10-CM
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
## 2023-05-10 NOTE — BH CRISIS PLAN
"Client Name: Santiago Mckinley       Client YOB: 1955  : 1955    Treatment Team (include name and contact information):     Psychotherapist: Cici Eugene  Psychiatrist: none   Release of information completed: no    \" None   Release of information completed: no      Healthcare Provider  No primary care provider on file  No primary provider on file  Type of Plan   * Child plans (children 15 yo and younger) must be completed and signed by the child's legal guardian   * Plans for all individuals 15 yo and above must be signed by the client  Plan Type: adolescent/adult (15 and over) Initial      My Personal Strengths are (in the client's own words):  I love to drive race cars, fly planes, drive trucks, I am kind and generous, nurturing       The stressors and triggers that may put me at risk are:  feeling a lack of control, being treated unfairly    Coping skills I can use to keep myself calm and safe:  [place]/meditate, listen to music    Coping skills/supports I can use to maintain abstinence from substance use:   Stopped drinking except on Friday and Saturday nights  The people that provide me with help and support: (Include name, contact, and how they can help)   Support person #1:   Yasmine Quirino     * Phone number: [phone]    * How can they help me?  Listen, be supportive       In the past, the following has helped me in times of crisis:    Being with other people, being outside in nature      If it is an emergency and you need immediate help, call     If there is a possibility of danger to yourself or others, call the following crisis hotline resources:     Adult Crisis Numbers  Suicide Prevention Hotline - Dial   [facility]: [address]: [address]: [address]: [phone]  [address]): [address] [facility] [address]: [address]: [facility] " Crisis Services: [phone] (daytime)  [phone] (after hours, weekends, holidays)     Child/Adolescent Crisis Numbers   [facility]: [address]: [phone]   [facility]: [phone]   [facility]: [phone]    Please note: Some counties do not have a separate number for Child/Adolescent specific crisis  If your county is not listed under Child/Adolescent, please call the adult number for your county     National Talk to Text Line   All Ages - 742-407    In the event your feelings become unmanageable, and you cannot reach your support system, you will call 911 immediately or go to the nearest hospital emergency room

## 2023-05-31 ENCOUNTER — TELEMEDICINE (OUTPATIENT)
Dept: BEHAVIORAL/MENTAL HEALTH CLINIC | Facility: CLINIC | Age: 68
End: 2023-05-31

## 2023-05-31 DIAGNOSIS — F33.1 MAJOR DEPRESSIVE DISORDER, RECURRENT EPISODE, MODERATE (HCC): Primary | ICD-10-CM

## 2023-05-31 NOTE — BH CRISIS PLAN
Client Name: Everton Bang       Client YOB: 1955  : 1955    Treatment Team (include name and contact information):     [facility]  Psychiatrist: none   Release of information completed: no        Healthcare Provider  No primary care provider on file  No primary provider on file  Type of Plan   * Child plans (children 15 yo and younger) must be completed and signed by the child's legal guardian   * Plans for all individuals 15 yo and above must be signed by the client  Plan Type: adolescent/adult (15 and over) Initial      My Personal Strengths are (in the client's own words):  I enjoy racing cars, , retired medic    The stressors and triggers that may put me at risk are:  being physically tired and angry    Coping skills I can use to keep myself calm and safe:  Listen to music    Coping skills/supports I can use to maintain abstinence from substance use: Moderate drinking    The people that provide me with help and support: (Include name, contact, and how they can help)   Support person #1: Roney Alfonso    * Phone number: [phone]    * How can they help me? Listen, be attentive, be supportive         In the past, the following has helped me in times of crisis:    Being in a quiet space      If it is an emergency and you need immediate help, call     If there is a possibility of danger to yourself or others, call the following crisis hotline resources:     Adult Crisis Numbers  Suicide Prevention Hotline - Dial   [facility]: Chrisg Darine 13: R Bennie 56: [address]: [phone]  [address] [place] [address] ([address]): [address] [place] [address] Street: [address] [place] [address] Avenue: [address] [place] [address] St: [phone] (daytime)         [phone] (after hours, weekends, holidays)     Child/Adolescent Crisis Numbers   [facility]: Sara Dillon 10: [phone]   Mina Rios: [address]: [phone]    Please note: Some counties do not have a separate number for Child/Adolescent specific crisis  If your county is not listed under Child/Adolescent, please call the adult number for your county     National Talk to Text Line   All Ages - [phone]    In the event your feelings become unmanageable, and you cannot reach your support system, you will call 911 immediately or go to the nearest hospital emergency room

## 2023-05-31 NOTE — PSYCH
"Behavioral Health Psychotherapy Progress Note    Psychotherapy Provided: Individual Psychotherapy     1  Major depressive disorder, recurrent episode, moderate ([address] )            Goals addressed in session: Goal 1     DATA:   Met with client in the office  Al shared that he and Lorraine Verdin are doing well, but he feels himself getting more angry  Partly, he feels, because he has not been sleeping as well at night  He has been to see a specialist and been told that there is nothing more they can do - given his age some dip in performance is not unusual    Talked about racing again, that he has the money and maybe access to a car, but he is not sure he really wants to race anymore  Will keep thinking about it  He and Amanda are planning to move to [place], to the [place] - about 1-2 hours from here  He will be close enough to spend time with his kids and grandkids  Therapist listened attentively to client sequence through emotions, and concerns  Validated emotions and encouraged him to problem solve around his areas of concern  Celebrated victories and encouraged the continued use of coping skills and mental reframing  During this session, this clinician used the following therapeutic modalities: Cognitive Behavioral Therapy    Substance Abuse was addressed during this session  If the client is diagnosed with a co-occurring substance use disorder, please indicate any changes in the frequency or amount of use: not drinking currently  Stage of change for addressing substance use diagnoses: Maintenance    ASSESSMENT:  Christen Gillis presents with a Euthymic/ normal mood  his affect is Normal range and intensity, which is congruent, with his mood and the content of the session  The client has made progress on their goals  Christen Gillis presents with a low risk of suicide, low risk of self-harm, and low risk of harm to others      For any risk assessment that surpasses a \"low\" rating, a safety plan must be " developed  A safety plan was indicated: no  If yes, describe in detail reach out for support    PLAN: Between sessions, Kelle Teague will reframe his anger - to something more positive  At the next session, the therapist will use Cognitive Behavioral Therapy to address anger management, depression, and trauma reactions  Behavioral Health Treatment Plan and Discharge Planning: Kelle Teague is aware of and agrees to continue to work on their treatment plan  They have identified and are working toward their discharge goals   yes    Visit start and stop times:    05/31/23  Start Time: 0802  Stop Time: 0900  Total Visit Time: 58 minutes

## 2023-06-28 ENCOUNTER — SOCIAL WORK (OUTPATIENT)
Dept: BEHAVIORAL/MENTAL HEALTH CLINIC | Facility: CLINIC | Age: 68
End: 2023-06-28
Payer: COMMERCIAL

## 2023-06-28 DIAGNOSIS — F33.1 MAJOR DEPRESSIVE DISORDER, RECURRENT EPISODE, MODERATE (HCC): Primary | ICD-10-CM

## 2023-06-28 PROCEDURE — 90837 PSYTX W PT 60 MINUTES: CPT | Performed by: PSYCHOLOGIST

## 2023-06-28 NOTE — BH TREATMENT PLAN
Outpatient Behavioral Health Psychotherapy Treatment Plan    Joaquin Nguyen  1955     Date of Initial Psychotherapy Assessment: 6/28/23   Date of Current Treatment Plan: 06/28/23  Treatment Plan Target Date:   Treatment Plan Expiration Date: six months    Diagnosis:   No diagnosis found  Area(s) of Need: Managing anger    Long Term Goal 1 (in the client's own words): I need to find a way to manage my anger so that it does not overwhelm me  Stage of Change: Action    Target Date for completion: review in six months     Anticipated therapeutic modalities: CBT     People identified to complete this goal: self      Objective 1: (identify the means of measuring success in meeting the objective): I will find and read a couple books on anger management and practice their techniques  Objective 2: (identify the means of measuring success in meeting the objective): I will work at shifting my thoughts to positive ones and learn about the Stop Technique  I am currently under the care of a [facility] psychiatric provider: no    My [facility] psychiatric provider is: none    I am currently taking psychiatric medications: No    I feel that I will be ready for discharge from mental health care when I reach the following (measurable goal/objective): when I am no longer triggered by my anger and overwhelmed  For children and adults who have a legal guardian:   Has there been any change to custody orders and/or guardianship status? NA  If yes, attach updated documentation  I have created my Crisis Plan and have been offered a copy of this plan    [address]: Diagnosis and Treatment Plan explained to [facility] acknowledges an understanding of their diagnosis  Joaquin Nguyen agrees to this treatment plan      I have been offered a copy of this Treatment Plan  yes

## 2023-06-28 NOTE — PSYCH
"Behavioral Health Psychotherapy Progress Note    Psychotherapy Provided: Individual Psychotherapy     No diagnosis found  Goals addressed in session: Goal 1     DATA:   Met client in person  Client shared that he is still feeling angry, particularly when he drives  We reviewed coping skills  Talked about building more positive neural networks actively  Explained how we sometimes have old habits which are no longer serving us, and that he can work on creating positive neural networks  Discussed how to do this and client was amenable  Sequenced through some of the positive things which have happened in his life, and celebrated with him  Encouraged client to continue to be thoughtful and take note of his triggers, and also what tends to calm him  Return in one month  During this session, this clinician used the following therapeutic modalities: Cognitive Behavioral Therapy    Substance Abuse was addressed during this session  If the client is diagnosed with a co-occurring substance use disorder, please indicate any changes in the frequency or amount of use: minimal use  Stage of change for addressing substance use diagnoses: Maintenance    ASSESSMENT:  Joaquin Nguyen presents with a Euthymic/ normal mood  his affect is Normal range and intensity, which is congruent, with his mood and the content of the session  The client has made progress on their goals  Joaquin Nguyen presents with a low risk of suicide, low risk of self-harm, and low risk of harm to others  For any risk assessment that surpasses a \"low\" rating, a safety plan must be developed  A safety plan was indicated: no  If yes, describe in detail Reach out to family and friends as needed    PLAN: Between sessions, Joaquin Nguyen will focus on the positive things that happen and \"tap\" them in  At the next session, the therapist will use Cognitive Behavioral Therapy to address anger, trauma and old habits      Behavioral Health Treatment " Plan and Discharge Planning: Kalli Graham is aware of and agrees to continue to work on their treatment plan  They have identified and are working toward their discharge goals   yes    Visit start and stop times:    06/28/23  Start Time: 0801  Stop Time: 0901  Total Visit Time: 60 minutes

## 2023-07-25 ENCOUNTER — TELEMEDICINE (OUTPATIENT)
Dept: BEHAVIORAL/MENTAL HEALTH CLINIC | Facility: CLINIC | Age: 68
End: 2023-07-25
Payer: COMMERCIAL

## 2023-07-25 DIAGNOSIS — F90.0 ATTENTION DEFICIT HYPERACTIVITY DISORDER, INATTENTIVE TYPE: ICD-10-CM

## 2023-07-25 DIAGNOSIS — F33.1 MAJOR DEPRESSIVE DISORDER, RECURRENT EPISODE, MODERATE (HCC): Primary | ICD-10-CM

## 2023-07-25 PROCEDURE — 90834 PSYTX W PT 45 MINUTES: CPT | Performed by: PSYCHOLOGIST

## 2023-07-25 NOTE — PSYCH
Behavioral Health Psychotherapy Progress Note    Psychotherapy Provided: Individual Psychotherapy     No diagnosis found. Goals addressed in session: Goal 1     DATA:   Met with client in office. 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 She may work for [employer] after this. They are planning a trip to [facility] in September. 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 Client spoke positively about making good decisions in his life. He is reconnecting with his two son's and feeling good about that. He has decided not to move to [place] after all, and will stay closer to family. He is looking for a place about 2 hours away, so he can be out in the country, but also close enough to visit with his kids and grandkids. Stated that sometimes he continues to feel extreme anger for brief moments, but is working on anger management skills and letting go. Feels that living in the country where things are less hectic and less stressful will help. Overall, using learned coping skills and feels that he is managing fairly well. Therapist listened attentively as client shared about struggles and frustrations. Encouraged client to express emotions and to consider both sides of any issue. Provided empathy and support and helped client review coping skills. Helped client identify successes in his life and celebrated those with client. During this session, this clinician used the following therapeutic modalities: Cognitive Behavioral Therapy    Substance Abuse was not addressed during this session. If the client is diagnosed with a co-occurring substance use disorder, please indicate any changes in the frequency or amount of use: no change. Stage of change for addressing substance use diagnoses: Maintenance    ASSESSMENT:  Db Brown presents with a Euthymic/ normal mood.      his affect is Normal range and intensity, which is congruent, with his mood and the content of the session. The client has made progress on their goals. Azra Lyons presents with a low risk of suicide, low risk of self-harm, and low risk of harm to others. For any risk assessment that surpasses a "low" rating, a safety plan must be developed. A safety plan was indicated: no  If yes, describe in detail reach out to family and friends for empathy and support    PLAN: Between sessions, Azra Lyons will continue to use leaned coping skills. Will use a substitution of a positive thought when he finds himself being triggered . At the next session, the therapist will use Cognitive Behavioral Therapy to address anger and PTSD. Behavioral Health Treatment Plan and Discharge Planning: Azra Lyons is aware of and agrees to continue to work on their treatment plan. They have identified and are working toward their discharge goals.  yes    Visit start and stop times:    07/25/23  Start Time: 0805  Stop Time: 0845  Total Visit Time: 40 minutes

## 2023-08-23 ENCOUNTER — SOCIAL WORK (OUTPATIENT)
Dept: BEHAVIORAL/MENTAL HEALTH CLINIC | Facility: CLINIC | Age: 68
End: 2023-08-23
Payer: COMMERCIAL

## 2023-08-23 DIAGNOSIS — F32.0 CURRENT MILD EPISODE OF MAJOR DEPRESSIVE DISORDER WITHOUT PRIOR EPISODE (HCC): Primary | ICD-10-CM

## 2023-08-23 PROCEDURE — 90834 PSYTX W PT 45 MINUTES: CPT | Performed by: PSYCHOLOGIST
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
## 2023-09-27 ENCOUNTER — SOCIAL WORK (OUTPATIENT)
Dept: BEHAVIORAL/MENTAL HEALTH CLINIC | Facility: CLINIC | Age: 68
End: 2023-09-27
Payer: COMMERCIAL

## 2023-09-27 DIAGNOSIS — F33.1 MAJOR DEPRESSIVE DISORDER, RECURRENT EPISODE, MODERATE (HCC): ICD-10-CM

## 2023-09-27 DIAGNOSIS — F90.0 ATTENTION DEFICIT HYPERACTIVITY DISORDER, INATTENTIVE TYPE: Primary | ICD-10-CM

## 2023-09-27 PROCEDURE — 90837 PSYTX W PT 60 MINUTES: CPT | Performed by: PSYCHOLOGIST

## 2023-09-27 NOTE — PSYCH
Behavioral Health Psychotherapy Progress Note    Psychotherapy Provided: Individual Psychotherapy     No diagnosis found. Goals addressed in session: Goal 1     DATA:   Met with  Client in  e office. Al shared that he is feeling well, and things are goig well with Amanda. They just returned from a vacation trip to [place]. They had driven along the coast and he reported that it was incredible. Al also shared about his two grandsons. [place] continue to talk with him about being yelled at, and feeling confused and frustrated. His younger grandson (on Amanda's side) is quiet and enjoys, finally, his time with them. Al shared that he wants to be a positive support in his grandkids' lives - that he well remembers getting in trouble a LOT when he was little and how confusing and devastating it had been. Therapist listened attentively as client shared that joys and challenges of her life. Helped client sequence through daily events and gain insight into precursors for difficult emotions. Reviewed coping skills and encouraged client to continue to use positive self care skills to help her maintain physical and emotional health and vitality. Provided empathy and support and encouraged client to continue to reach out to friends and supports      During this session, this clinician used the following therapeutic modalities: Cognitive Behavioral Therapy    Substance Abuse was not addressed during this session. If the client is diagnosed with a co-occurring substance use disorder, please indicate any changes in the frequency or amount of use:  . Stage of change for addressing substance use diagnoses: Maintenance    ASSESSMENT:  Anel Moore presents with a Euthymic/ normal mood. his affect is Normal range and intensity, which is congruent, with his mood and the content of the session. The client has made progress on their goals.       Anel Moore presents with a low risk of suicide, low risk of self-harm, and low risk of harm to others. For any risk assessment that surpasses a "low" rating, a safety plan must be developed. A safety plan was indicated: no  If yes, describe in detail reach out to friends and family for support    PLAN: Between sessions, Marciano Rodriges will practice good coping skills, use mindfulness to manage stress. At the next session, the therapist will use Cognitive Behavioral Therapy to address emotional overwhelm. Behavioral Health Treatment Plan and Discharge Planning: Marciano Rodriges is aware of and agrees to continue to work on their treatment plan. They have identified and are working toward their discharge goals.  yes    Visit start and stop times:    09/27/23  Start Time: 0801  Stop Time: 0900  Total Visit Time: 59 minutes

## 2023-10-31 ENCOUNTER — TELEMEDICINE (OUTPATIENT)
Dept: BEHAVIORAL/MENTAL HEALTH CLINIC | Facility: CLINIC | Age: 68
End: 2023-10-31
Payer: COMMERCIAL

## 2023-10-31 DIAGNOSIS — F90.0 ATTENTION DEFICIT HYPERACTIVITY DISORDER, INATTENTIVE TYPE: Primary | ICD-10-CM

## 2023-10-31 DIAGNOSIS — F33.1 MAJOR DEPRESSIVE DISORDER, RECURRENT EPISODE, MODERATE (HCC): ICD-10-CM

## 2023-10-31 PROCEDURE — 90837 PSYTX W PT 60 MINUTES: CPT | Performed by: PSYCHOLOGIST
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
## 2023-11-28 ENCOUNTER — SOCIAL WORK (OUTPATIENT)
Dept: BEHAVIORAL/MENTAL HEALTH CLINIC | Facility: CLINIC | Age: 68
End: 2023-11-28
Payer: COMMERCIAL

## 2023-11-28 DIAGNOSIS — F33.1 MAJOR DEPRESSIVE DISORDER, RECURRENT EPISODE, MODERATE (HCC): ICD-10-CM

## 2023-11-28 DIAGNOSIS — F90.0 ATTENTION DEFICIT HYPERACTIVITY DISORDER, INATTENTIVE TYPE: Primary | ICD-10-CM

## 2023-11-28 PROCEDURE — 90834 PSYTX W PT 45 MINUTES: CPT | Performed by: PSYCHOLOGIST

## 2023-11-28 NOTE — PSYCH
Individual Therapy Note  Behavioral Health Psychotherapy Progress Note    Psychotherapy Provided: Individual Psychotherapy     DATA:   Met with client in the office. He shared that he and Mario Alberto Finnegan are looking at some property on the top of a hill with a glorious view of the surrounding area. It is a property that has been subdivided into 9 lots and he wants one. But, then he thought he might buy all of them and keep them as investments. Al was excited about this opportunity and is already deciding where to put the house, where to put the driveway, etc.  He shared that Amanda's daughter, after having some very difficult time with her boyfriend, has decided to get engaged. He and Amanda were baffled with why they were moving forward after having been so unhappy for the recent past.  However, Al stated that he has stayed out of it- and he encouraged Amanda to reach out to [facility] for support. He also shared that he had a conversation with his son - and his son is having conflict in his marriage. Al babysits Hollie Roca some mornings, and he asked Al about parents  and shared that he hopes his parents do not separate. Overall, Al reported that while he is still triggered fdrom time to time and does have some anger issues, he is doing well, feeling stable and moving forward with his life. Therapist provided empathy and support. Listened attentively as client sequenced through thoughts and emotions and assisted client in gaining some insight into their problems and concerns. Validated client's emotions and frustrations while pointing to client's strengths and coping skills to help them makes sense of their circumstances and their place in their community. Client will continue to use learned coping skills and reach out to friends and family for support. During this session, this clinician used the following therapeutic modalities: Cognitive Behavioral Therapy, Client centered and trauma work, as needed. Substance Abuse was not addressed during this session. If the client is diagnosed with a co-occurring substance use disorder, please indicate any changes in the frequency or amount of use: no change. Stage of change for addressing substance use diagnoses: Maintenance    ASSESSMENT:  This Client Tony Juarez presents with a Euthymic/ normal mood. affect is Normal in range and intensity and Blunted, which is congruent, with mood and the content of the session. The client has made progress on their goals. Al presents with a low risk of suicide, low risk of self-harm, and low risk of harm to others. For any risk assessment that surpasses a "low" rating, a safety plan must be developed. A safety plan was indicated: no  If yes, describe in detail:   Reach out to friends and family for empathy and support      PLAN: Between sessions, Al will continue to use coping skills daily, set good boundaries with family, and focus on what is good in his life. f . At the next session, the therapist will use Client-centered Therapy, Cognitive Behavioral Therapy, Mindfulness-based Strategies, Motivational Interviewing, Supportive Psychotherapy and trauma therapy to address issues of emotional overwhelm, setting and keeping good boundaries, and using coping skills and self-care skills daily, in order to manage depression and anxiety. Behavioral Health Treatment Plan and Discharge Planning: Al is aware of and agrees to continue to work on their treatment plan. They have identified and are working toward their discharge goals.  yes      Goals addressed in session: Goal 1     Reason for visit is   Chief Complaint   Patient presents with    Virtual Regular Visit       Encounter provider Wing Pranav PsyD    Provider located at [address] [place] [address]  [phone]    The patient was identified by name and date of birth. Juanjo Greer was informed that this is a telemedicine visit and that the visit is being conducted through the [facility]. S/he agrees to proceed. My office door was closed. No one else was in the room. S/he acknowledged consent and understanding of privacy and security of the video platform. The patient has agreed to participate and understands they can discontinue the visit at any time. Patient is aware this is a billable service. Subjective  Al presented as an attractive and casually dressed  male who appeared their stated age of 77years old     . Al was friendly and cooperative. S/he thanked the therapist and expressed appreciation for the session. HPI Review of Systems    Video Exam    There were no vitals filed for this visit.     Physical Exam     Visit Time  11/28/23  Start Time: 0751  Stop Time: 0901  Total Visit Time: 51 minutes

## 2024-01-03 ENCOUNTER — SOCIAL WORK (OUTPATIENT)
Dept: BEHAVIORAL/MENTAL HEALTH CLINIC | Facility: CLINIC | Age: 69
End: 2024-01-03
Payer: COMMERCIAL

## 2024-01-03 DIAGNOSIS — F33.1 MAJOR DEPRESSIVE DISORDER, RECURRENT EPISODE, MODERATE (HCC): ICD-10-CM

## 2024-01-03 DIAGNOSIS — F90.0 ATTENTION DEFICIT HYPERACTIVITY DISORDER, INATTENTIVE TYPE: Primary | ICD-10-CM

## 2024-01-03 PROCEDURE — 90837 PSYTX W PT 60 MINUTES: CPT | Performed by: PSYCHOLOGIST

## 2024-01-03 NOTE — PSYCH
Behavioral Health Psychotherapy Progress Note    Psychotherapy Provided: Individual Psychotherapy     No diagnosis found.    Goals addressed in session: Goal 1     DATA:   Al shared that his holidays went very well. He was invited to his son's house for griffin Dinner - shared that this is the first time in 5-6 years that he has been invited to any family member's house for [place].  He shared that the kids were a bit wild, but he was happy and thankful to be sharing this holiday with his son, daughter in law and grandchildren. Al shared that his son is having marital problems - stated that Gale does not listen to him, and she complains that he does not listen to her.  Encouraged Al to be a good listener himself, and realize that he has learned a lot about getting along with others, and he can be a good support to his son.   He is going to the [place] for three weeks later this month and really looking forward to it. 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 Overall, he is doing well and getting along better and better with his family. Shared that Amanda and her daughter continue to have some conflicts.  He is frustrated by the daughter's disrespectful language and constant changing of plans.  But, he acknowledged that he is trying to support Amanda and let her work out that relationship.      During this session, this clinician used the following therapeutic modalities: Cognitive Behavioral Therapy, supportive, insight focused    Substance Abuse was addressed during this session. If the client is diagnosed with a co-occurring substance use disorder, please indicate any changes in the frequency or amount of use: one shot each night - reports sleeping better. Stage of change for addressing substance use diagnoses: Maintenance    ASSESSMENT:  Sunil Neves presents with a Euthymic/ normal mood.     his affect is Normal range and  "intensity, which is congruent, with his mood and the content of the session. The client has made progress on their goals.      Sunil Neves presents with a minimal risk of suicide, minimal risk of self-harm, and minimal risk of harm to others.    For any risk assessment that surpasses a \"low\" rating, a safety plan must be developed.    A safety plan was indicated: no  If yes, describe in detail NA    PLAN: Between sessions, Sunil Neves will track moods and sleep as he adds one shot per night to his habits.  Moods have been good over the holidays . At the next session, the therapist will use Cognitive Behavioral Therapy to address anger management, depression and emotional overwhelm.    Behavioral Health Treatment Plan and Discharge Planning: Sunil Neves is aware of and agrees to continue to work on their treatment plan. They have identified and are working toward their discharge goals. yes    Visit start and stop times:    01/03/24  Start Time: 0801  Stop Time: 0900  Total Visit Time: 59 minutes  "

## 2024-02-14 ENCOUNTER — SOCIAL WORK (OUTPATIENT)
Dept: BEHAVIORAL/MENTAL HEALTH CLINIC | Facility: CLINIC | Age: 69
End: 2024-02-14
Payer: COMMERCIAL

## 2024-02-14 DIAGNOSIS — F33.1 MAJOR DEPRESSIVE DISORDER, RECURRENT EPISODE, MODERATE (HCC): ICD-10-CM

## 2024-02-14 DIAGNOSIS — F90.0 ATTENTION DEFICIT HYPERACTIVITY DISORDER, INATTENTIVE TYPE: Primary | ICD-10-CM

## 2024-02-14 PROCEDURE — 90837 PSYTX W PT 60 MINUTES: CPT | Performed by: PSYCHOLOGIST

## 2024-02-14 NOTE — PSYCH
"  Behavioral Health Psychotherapy Progress Note    Psychotherapy Provided: Individual Psychotherapy     No diagnosis found.    Goals addressed in session: Goal 1     DATA:   Met with client in the office.   Al shared that he has been feeling more positive and thankful lately. Had a great time in the [place] and felt relaxed, peaceful, hopeful, and content. He admitted that when he returned to the [place] he had an experience which set him back. He has purchased several plots of land for development and had selected the highest piece as the place for his new house (prefab and delivered). But the  had met him at the property and told him \"this is impossible\"  This frustrated Al, that he was not even willing to discuss options or work arounds.  But, Al admitted, that he found himself frustrated and easily irritated by others. He was able to recognize this, and took steps to manage his stress.      During this session, this clinician used the following therapeutic modalities: Cognitive Behavioral Therapy    Substance Abuse was addressed during this session. If the client is diagnosed with a co-occurring substance use disorder, please indicate any changes in the frequency or amount of use: no change . Stage of change for addressing substance use diagnoses: Maintenance    ASSESSMENT:  Sunil Neves presents with a Euthymic/ normal mood.Frustrated that he has been feeling angry and irritable lately.      his affect is Normal range and intensity, which is congruent, with his mood and the content of the session. The client has made progress on their goals.      Sunil Neves presents with a low risk of suicide, low risk of self-harm, and low risk of harm to others.    For any risk assessment that surpasses a \"low\" rating, a safety plan must be developed.    A safety plan was indicated: no  If yes, describe in detail reach out to friend and family for empathy and support    PLAN: Between sessions, Sunil Neves will " continue to use coping skills to manage emotions and make thoughtful decisions. At the next session, the therapist will use Cognitive Behavioral Therapy to address anger issues and PTSD triggers.    Behavioral Health Treatment Plan and Discharge Planning: Sunil Neves is aware of and agrees to continue to work on their treatment plan. They have identified and are working toward their discharge goals. yes    Visit start and stop times:    02/14/24  Start Time: 0800  Stop Time: 0855  Total Visit Time: 55 minutes

## 2024-02-14 NOTE — BH TREATMENT PLAN
Outpatient Behavioral Health Psychotherapy Treatment Plan    Sunil Neves  1955     Date of Initial Psychotherapy Assessment: 6/28/23   Date of Current Treatment Plan: 02/14/24  Treatment Plan Target Date:   Treatment Plan Expiration Date: six months    Diagnosis:   1. Attention deficit hyperactivity disorder, inattentive type        2. Major depressive disorder, recurrent episode, moderate (HCC)            Area(s) of Need: Managing anger    Long Term Goal 1 (in the client's own words): I need to find a way to manage my anger so that it does not overwhelm me. Review 2/14/24 Many days when this is the case, but when triggered, still can be set off by anger reactions.    Stage of Change: Action    Target Date for completion: review in six months     Anticipated therapeutic modalities: CBT, Supportive Therapy, Trauma Work as  needed     People identified to complete this goal: self      Objective 1: (identify the means of measuring success in meeting the objective): I will find and read a couple books on anger management and practice their techniques. Reviewed 2/14/24. I continue to read books which bring me insight and help me manage my anger reactions.       Objective 2: (identify the means of measuring success in meeting the objective): I will work at shifting my thoughts to positive ones and learn about the Stop Technique. Reviewed 2/14/24  Client reported that he is much more able to do this, and has had weeks of time when his thoughts are more positive and he is thankful to God for his inner peace.  Continue goal.              I am currently under the care of a [facility] psychiatric provider: no    My [facility] psychiatric provider is: none    I am currently taking psychiatric medications: No    I feel that I will be ready for discharge from mental health care when I reach the following (measurable goal/objective): when I am no longer triggered by my anger and overwhelmed.    For children and adults who  have a legal guardian:   Has there been any change to custody orders and/or guardianship status? NA. If yes, attach updated documentation.    I have created my Crisis Plan and have been offered a copy of this plan    Behavioral Health Treatment Plan [facility]: Diagnosis and Treatment Plan explained to Sunil Neves acknowledges an understanding of their diagnosis. Sunil Neves agrees to this treatment plan.    I have been offered a copy of this Treatment Plan. yes

## 2024-03-13 ENCOUNTER — SOCIAL WORK (OUTPATIENT)
Dept: BEHAVIORAL/MENTAL HEALTH CLINIC | Facility: CLINIC | Age: 69
End: 2024-03-13
Payer: COMMERCIAL

## 2024-03-13 DIAGNOSIS — F90.0 ATTENTION DEFICIT HYPERACTIVITY DISORDER, INATTENTIVE TYPE: Primary | ICD-10-CM

## 2024-03-13 DIAGNOSIS — F33.1 MAJOR DEPRESSIVE DISORDER, RECURRENT EPISODE, MODERATE (HCC): ICD-10-CM

## 2024-03-13 PROCEDURE — 90837 PSYTX W PT 60 MINUTES: CPT | Performed by: PSYCHOLOGIST

## 2024-03-13 NOTE — PSYCH
"Behavioral Health Psychotherapy Progress Note    Psychotherapy Provided: Individual Psychotherapy     No diagnosis found.    Goals addressed in session: Goal 1     DATA:   Met in office.  Shared that he is  building his house - working on excavating and will put in basement - then they will deliver house - in two parts, side by side.  He was very excited.   Still some anger, \"for no reason\" about feeling irritable.  He also shared that his son is having trouble with his grandson - who is 9. He was removed from the sports team. Now there are some inappropriate pictures circulating on friend's phones.  His son is very stressed and upset, and Al is worried/concerned for his son and his grandson. Therapist provided empathy and support. Listened attentively as client sequenced through thoughts and emotions and assisted client in gaining some insight into their problems and concerns.  Validated client's emotions and frustrations while pointing to client's strengths and coping skills to help them makes sense of their circumstances and their place in their community.  Client will continue to use learned coping skills and reach out to friends and family for support.      During this session, this clinician used the following therapeutic modalities: Supportive Psychotherapy    Substance Abuse was addressed during this session. If the client is diagnosed with a co-occurring substance use disorder, please indicate any changes in the frequency or amount of use: minimal use. Stage of change for addressing substance use diagnoses: Maintenance    ASSESSMENT:  Sunil Neves presents with a Euthymic/ normal mood.     his affect is Normal range and intensity, which is congruent, with his mood and the content of the session. The client has made progress on their goals.      Sunil Neves presents with a low risk of suicide, low risk of self-harm, and low risk of harm to others.    For any risk assessment that surpasses a \"low\" rating, a " safety plan must be developed.    A safety plan was indicated: no  If yes, describe in detail NA    PLAN: Between sessions, Sunil Neves will continue to focus on positive affirmations, acknowledge anger when it arises, then choose to use a coping skill. . At the next session, the therapist will use Supportive Psychotherapy to address anger issues, transitions, and residual trauma responses.    Behavioral Health Treatment Plan and Discharge Planning: Sunil Neves is aware of and agrees to continue to work on their treatment plan. They have identified and are working toward their discharge goals. yes    Visit start and stop times:    03/13/24    Start Time: 0800  Stop Time: 0855  Total Visit Time: 55 minutes

## 2024-05-17 ENCOUNTER — SOCIAL WORK (OUTPATIENT)
Dept: BEHAVIORAL/MENTAL HEALTH CLINIC | Facility: CLINIC | Age: 69
End: 2024-05-17
Payer: COMMERCIAL

## 2024-05-17 DIAGNOSIS — F90.0 ATTENTION DEFICIT HYPERACTIVITY DISORDER, INATTENTIVE TYPE: Primary | ICD-10-CM

## 2024-05-17 DIAGNOSIS — F33.1 MAJOR DEPRESSIVE DISORDER, RECURRENT EPISODE, MODERATE (HCC): ICD-10-CM

## 2024-05-17 PROCEDURE — 90837 PSYTX W PT 60 MINUTES: CPT | Performed by: PSYCHOLOGIST

## 2024-05-20 NOTE — PSYCH
"Behavioral Health Psychotherapy Progress Note    Psychotherapy Provided: Individual Psychotherapy     No diagnosis found.    Goals addressed in session: Goal 1     DATA:   Met with client in the office.   Client shared that he is building his own house - the foundation, [employer], the road. The prefab house was delivered and crews have been busy completing the work. He is very excited about this project, but also quite frustrated with some errors that occurred.   Talked about his son and grandson - who are both very frustrated - Son's relationship with his wife is strained,but Al is trying to be supportive and to be a spokes person for his grandson.  Processed his emotions and what he can and cannot do - boundaries.    Feeling very good overall. Hopefuls about the future. Continues to plan fun vacations. Getting along well with Amanda. Frustrated with Amanda's daughter, who is in a conflicted relationship and this is causing Amanda pain and stress.   Therapist listened attentively and provided empathy and support.     During this session, this clinician used the following therapeutic modalities: Supportive Psychotherapy    Substance Abuse was not addressed during this session. If the client is diagnosed with a co-occurring substance use disorder, please indicate any changes in the frequency or amount of use: no change. Stage of change for addressing substance use diagnoses: Maintenance    ASSESSMENT:  Sunil Neves presents with a Euthymic/ normal mood.     his affect is Normal range and intensity, which is congruent, with his mood and the content of the session. The client has made progress on their goals.      Sunil Neves presents with a low risk of suicide, minimal risk of self-harm, and minimal risk of harm to others.    For any risk assessment that surpasses a \"low\" rating, a safety plan must be developed.    A safety plan was indicated: no  If yes, describe in detail NA    PLAN: Between sessions, Sunil Neves will " continue to use good self care, pace himself, and continue with meditation. At the next session, the therapist will use Supportive Psychotherapy to address transitions in his life. .    Behavioral Health Treatment Plan and Discharge Planning: Sunil Neves is aware of and agrees to continue to work on their treatment plan. They have identified and are working toward their discharge goals. yes    Visit start and stop times:    05/19/24  Start Time: 1000  Stop Time: 1055  Total Visit Time: 55 minutes

## 2024-12-03 ENCOUNTER — DOCUMENTATION (OUTPATIENT)
Dept: BEHAVIORAL/MENTAL HEALTH CLINIC | Facility: CLINIC | Age: 69
End: 2024-12-03

## 2024-12-03 NOTE — PROGRESS NOTES
Psychotherapy Discharge Summary    Preferred Name: Sunil Neves  YOB: 1955    Admission date to psychotherapy: 9/25/2022    Referred by: Self    Presenting Problem: Anger and PTSD from primary relationship and from     Course of treatment included : individual therapy     Progress/Outcome of Treatment Goals (brief summary of course of treatment) Good outcome. Client participated in EMDR therapy to address trauma experiences from childhood.     Treatment Complications (if any): Client was a good candidate and participated fully in the therapy sessions    Treatment Progress: excellent    Current [facility] Psychiatric Provider: None    Discharge Medications include: None    Discharge Date: 5/25/2024    Discharge Diagnosis: No diagnosis found.    Criteria for Discharge: completed treatment goals and objectives and is no longer in need of services    Aftercare recommendations include (include specific referral names and phone numbers, if appropriate): Return for treatment if anything else comes up    Prognosis: excellent